# Patient Record
Sex: FEMALE | Race: WHITE | NOT HISPANIC OR LATINO | Employment: FULL TIME
[De-identification: names, ages, dates, MRNs, and addresses within clinical notes are randomized per-mention and may not be internally consistent; named-entity substitution may affect disease eponyms.]

---

## 2017-09-10 ENCOUNTER — HEALTH MAINTENANCE LETTER (OUTPATIENT)
Age: 32
End: 2017-09-10

## 2018-09-17 ENCOUNTER — HEALTH MAINTENANCE LETTER (OUTPATIENT)
Age: 33
End: 2018-09-17

## 2019-11-08 ENCOUNTER — HEALTH MAINTENANCE LETTER (OUTPATIENT)
Age: 34
End: 2019-11-08

## 2020-02-23 ENCOUNTER — HEALTH MAINTENANCE LETTER (OUTPATIENT)
Age: 35
End: 2020-02-23

## 2020-07-27 ENCOUNTER — VIRTUAL VISIT (OUTPATIENT)
Dept: FAMILY MEDICINE | Facility: OTHER | Age: 35
End: 2020-07-27
Payer: COMMERCIAL

## 2020-07-27 PROCEDURE — 99421 OL DIG E/M SVC 5-10 MIN: CPT | Performed by: PHYSICIAN ASSISTANT

## 2020-07-28 NOTE — PROGRESS NOTES
"Date: 2020 20:08:07  Clinician: Scar Szymanski  Clinician NPI: [id number]  Patient: Stephani Rider  Patient : 1985  Patient Address: [address] [place], [place] [address]  Patient Phone: [phone]  Visit Protocol: UTI  Patient Summary:  Stephani is a 34 year old ( : 1985 ) female who initiated a Visit for a presumed bladder infection. When asked the question \"Please sign me up to receive news, health information and promotions from [employer].\", Stephani responded \"No\".   Her symptoms started 1-3 days ago and consist of abdominal pain, urinary frequency, urgency, dysuria, and feeling as if the bladder is never empty.   Symptom details     Urine color: The color of her urine is yellow.     Abdominal pain: The pain is mild (1-3 on a 10 point pain scale).      Denied symptoms include flank pain, vaginal discharge, chills, urinary incontinence, vomiting, vaginal itching, foul-smelling urine, and nausea. She does not feel feverish.   Stephani has not used any over-the-counter medications or home remedies to relieve her current symptoms.  Precipitating events  Stephani denies having a sexually transmitted disease.  Pertinent medical history  Stephani has had a bladder infection before but has not had any in the past 12 months. Her current symptoms are similar to her previous bladder infection symptoms.   She is not sure what antibiotics have been effective in treating her past bladder infections.   Stephani has not been prescribed antibiotics to prevent frequent or repeated bladder infections in the past and does not get yeast infections when she takes antibiotics. She has not experienced problems or side effects with any of the common antibiotics used to treat bladder infections.   Stephani does not have a history of kidney stones. She has not used a catheter or been a patient in a hospital or nursing home in the past 2 weeks.   Stephani does not smoke or use smokeless tobacco.   She denies pregnancy and denies " breastfeeding. She has menstruated in the past month.     MEDICATIONS: citalopram oral, bupropion HCl oral, ALLERGIES: NKDA  Clinician Response:  Dear Stephani,  Based on the information you have provided, you likely have an acute urinary tract infection, also called a bladder infection. Bladder infections occur when bacteria from the outside of the body enters the urinary tract. Any part of the urinary system can be infected, but the bladder is the most common.  Medication information  I am prescribing:     Nitrofurantoin monohyd/m-cryst (Macrobid) 100 mg oral capsule. Take 1 capsule by mouth every 12 hours for 5 days. Take this medication with food. There are no refills with this prescription.   The medication I prescribed for your bladder infection is an antibiotic. Continue taking the medication until it is gone even if you feel better.   Yeast infections can be a common side effect of antibiotics. The most common symptom of a yeast infection is itchiness in and around the vagina. Other signs and symptoms include burning, redness, or a thick, white vaginal discharge that looks like cottage cheese and does not have a bad smell.  Self care  Urination helps to flush bacteria from the urinary tract. For this reason, drinking water and urinating often helps relieve some urinary symptoms and can decrease your risk of getting bladder infections in the future.  Other steps you can take to prevent future bladder infections include:     Wipe front to back after using the bathroom    Urinate after sexual intercourse    Avoid using deodorant sprays, douches, or powders in the vaginal area     When to seek care  Please make an appointment to be seen in a clinic or urgent care if any of the following occur:     You develop new symptoms or your symptoms become worse    You have medication side effects that make it difficult to take them as prescribed    Your symptoms do not improve within 1-2 days of starting treatment    You  have symptoms of a bladder infection that return shortly after completing treatment     It is possible to have an allergic reaction to an antibiotic even if you have not had one in the past. If you notice a new rash, significant swelling, or difficulty breathing, stop taking this medication immediately and go to a clinic or urgent care.   Diagnosis: Acute uncomplicated bladder infection  Diagnosis ICD: N39.0  Prescription: nitrofurantoin monohyd/m-cryst (Macrobid) 100 mg oral capsule 10 capsule, 5 days supply. Take 1 capsule by mouth every 12 hours for 5 days. Refills: 0, Refill as needed: no, Allow substitutions: yes  Pharmacy: [id number] - [phone] - [id number] [id number]

## 2020-12-06 ENCOUNTER — HEALTH MAINTENANCE LETTER (OUTPATIENT)
Age: 35
End: 2020-12-06

## 2021-04-11 ENCOUNTER — HEALTH MAINTENANCE LETTER (OUTPATIENT)
Age: 36
End: 2021-04-11

## 2021-07-16 ENCOUNTER — RECORDS - HEALTHEAST (OUTPATIENT)
Dept: ADMINISTRATIVE | Facility: CLINIC | Age: 36
End: 2021-07-16

## 2021-09-25 ENCOUNTER — HEALTH MAINTENANCE LETTER (OUTPATIENT)
Age: 36
End: 2021-09-25

## 2022-01-15 ENCOUNTER — HEALTH MAINTENANCE LETTER (OUTPATIENT)
Age: 37
End: 2022-01-15

## 2022-06-21 ENCOUNTER — OFFICE VISIT (OUTPATIENT)
Dept: FAMILY MEDICINE | Facility: CLINIC | Age: 37
End: 2022-06-21
Payer: COMMERCIAL

## 2022-06-21 VITALS
HEART RATE: 69 BPM | OXYGEN SATURATION: 100 % | HEIGHT: 65 IN | TEMPERATURE: 98.1 F | WEIGHT: 187 LBS | RESPIRATION RATE: 16 BRPM | BODY MASS INDEX: 31.16 KG/M2 | SYSTOLIC BLOOD PRESSURE: 118 MMHG | DIASTOLIC BLOOD PRESSURE: 72 MMHG

## 2022-06-21 DIAGNOSIS — Z00.00 ROUTINE GENERAL MEDICAL EXAMINATION AT A HEALTH CARE FACILITY: Primary | ICD-10-CM

## 2022-06-21 DIAGNOSIS — Z13.9 SCREENING FOR CONDITION: ICD-10-CM

## 2022-06-21 DIAGNOSIS — F41.9 ANXIETY: ICD-10-CM

## 2022-06-21 DIAGNOSIS — Z12.4 CERVICAL CANCER SCREENING: ICD-10-CM

## 2022-06-21 PROCEDURE — 87624 HPV HI-RISK TYP POOLED RSLT: CPT | Performed by: FAMILY MEDICINE

## 2022-06-21 PROCEDURE — 36415 COLL VENOUS BLD VENIPUNCTURE: CPT | Performed by: FAMILY MEDICINE

## 2022-06-21 PROCEDURE — 99385 PREV VISIT NEW AGE 18-39: CPT | Performed by: FAMILY MEDICINE

## 2022-06-21 PROCEDURE — G0145 SCR C/V CYTO,THINLAYER,RESCR: HCPCS | Performed by: FAMILY MEDICINE

## 2022-06-21 PROCEDURE — 80061 LIPID PANEL: CPT | Performed by: FAMILY MEDICINE

## 2022-06-21 RX ORDER — ESCITALOPRAM OXALATE 10 MG/1
10 TABLET ORAL DAILY
Qty: 90 TABLET | Refills: 2 | Status: SHIPPED | OUTPATIENT
Start: 2022-06-21 | End: 2023-05-03

## 2022-06-21 SDOH — ECONOMIC STABILITY: FOOD INSECURITY: WITHIN THE PAST 12 MONTHS, THE FOOD YOU BOUGHT JUST DIDN'T LAST AND YOU DIDN'T HAVE MONEY TO GET MORE.: NEVER TRUE

## 2022-06-21 SDOH — ECONOMIC STABILITY: INCOME INSECURITY: IN THE LAST 12 MONTHS, WAS THERE A TIME WHEN YOU WERE NOT ABLE TO PAY THE MORTGAGE OR RENT ON TIME?: NO

## 2022-06-21 SDOH — ECONOMIC STABILITY: FOOD INSECURITY: WITHIN THE PAST 12 MONTHS, YOU WORRIED THAT YOUR FOOD WOULD RUN OUT BEFORE YOU GOT MONEY TO BUY MORE.: NEVER TRUE

## 2022-06-21 SDOH — HEALTH STABILITY: PHYSICAL HEALTH: ON AVERAGE, HOW MANY MINUTES DO YOU ENGAGE IN EXERCISE AT THIS LEVEL?: 50 MIN

## 2022-06-21 SDOH — ECONOMIC STABILITY: INCOME INSECURITY: HOW HARD IS IT FOR YOU TO PAY FOR THE VERY BASICS LIKE FOOD, HOUSING, MEDICAL CARE, AND HEATING?: NOT HARD AT ALL

## 2022-06-21 SDOH — ECONOMIC STABILITY: TRANSPORTATION INSECURITY
IN THE PAST 12 MONTHS, HAS THE LACK OF TRANSPORTATION KEPT YOU FROM MEDICAL APPOINTMENTS OR FROM GETTING MEDICATIONS?: NO

## 2022-06-21 SDOH — ECONOMIC STABILITY: TRANSPORTATION INSECURITY
IN THE PAST 12 MONTHS, HAS LACK OF TRANSPORTATION KEPT YOU FROM MEETINGS, WORK, OR FROM GETTING THINGS NEEDED FOR DAILY LIVING?: NO

## 2022-06-21 SDOH — HEALTH STABILITY: PHYSICAL HEALTH: ON AVERAGE, HOW MANY DAYS PER WEEK DO YOU ENGAGE IN MODERATE TO STRENUOUS EXERCISE (LIKE A BRISK WALK)?: 7 DAYS

## 2022-06-21 ASSESSMENT — LIFESTYLE VARIABLES
HOW OFTEN DO YOU HAVE SIX OR MORE DRINKS ON ONE OCCASION: MONTHLY
AUDIT-C TOTAL SCORE: 7
SKIP TO QUESTIONS 9-10: 0
HOW OFTEN DO YOU HAVE A DRINK CONTAINING ALCOHOL: 4 OR MORE TIMES A WEEK
HOW MANY STANDARD DRINKS CONTAINING ALCOHOL DO YOU HAVE ON A TYPICAL DAY: 3 OR 4

## 2022-06-21 ASSESSMENT — ENCOUNTER SYMPTOMS
FREQUENCY: 0
CHILLS: 0
PALPITATIONS: 0
SHORTNESS OF BREATH: 0
HEMATOCHEZIA: 0
NAUSEA: 0
SORE THROAT: 0
DYSURIA: 0
HEARTBURN: 0
BREAST MASS: 0
NERVOUS/ANXIOUS: 1
MYALGIAS: 0
ABDOMINAL PAIN: 0
WEAKNESS: 0
FEVER: 0
HEADACHES: 0
JOINT SWELLING: 0
EYE PAIN: 0
DIZZINESS: 0
COUGH: 0
PARESTHESIAS: 0
HEMATURIA: 0
CONSTIPATION: 0
DIARRHEA: 0
ARTHRALGIAS: 0

## 2022-06-21 ASSESSMENT — SOCIAL DETERMINANTS OF HEALTH (SDOH)
HOW OFTEN DO YOU GET TOGETHER WITH FRIENDS OR RELATIVES?: THREE TIMES A WEEK
IN A TYPICAL WEEK, HOW MANY TIMES DO YOU TALK ON THE PHONE WITH FAMILY, FRIENDS, OR NEIGHBORS?: TWICE A WEEK
HOW OFTEN DO YOU ATTEND CHURCH OR RELIGIOUS SERVICES?: PATIENT DECLINED
DO YOU BELONG TO ANY CLUBS OR ORGANIZATIONS SUCH AS CHURCH GROUPS UNIONS, FRATERNAL OR ATHLETIC GROUPS, OR SCHOOL GROUPS?: NO

## 2022-06-21 NOTE — PROGRESS NOTES
SUBJECTIVE:   CC: Stephani Rider is an 36 year old woman who presents for preventive health visit.     Works as a  .   Lives with her  .  Experiencing symptoms of anxiety .  Was on Celexa wants to try escitalopram .  Felt more effective when tried her  medication .    Patient has been advised of split billing requirements and indicates understanding: Yes  Healthy Habits:     Getting at least 3 servings of Calcium per day:  Yes    Bi-annual eye exam:  NO    Dental care twice a year:  Yes    Sleep apnea or symptoms of sleep apnea:  None    Diet:  Regular (no restrictions)    Frequency of exercise:  6-7 days/week    Duration of exercise:  45-60 minutes    Taking medications regularly:  Yes    Medication side effects:  None    PHQ-2 Total Score: 2    Additional concerns today:  Yes        Today's PHQ-2 Score:   PHQ-2 ( 1999 Pfizer) 6/21/2022   Q1: Little interest or pleasure in doing things 1   Q2: Feeling down, depressed or hopeless 1   PHQ-2 Score 2   Q1: Little interest or pleasure in doing things Several days   Q2: Feeling down, depressed or hopeless Several days   PHQ-2 Score 2       Abuse: Current or Past (Physical, Sexual or Emotional) - No  Do you feel safe in your environment? Yes    Have you ever done Advance Care Planning? (For example, a Health Directive, POLST, or a discussion with a medical provider or your loved ones about your wishes): No, advance care planning information given to patient to review.  Patient plans to discuss their wishes with loved ones or provider.      Social History     Tobacco Use     Smoking status: Never Smoker     Smokeless tobacco: Never Used   Substance Use Topics     Alcohol use: No       Alcohol Use 6/21/2022   Prescreen: >3 drinks/day or >7 drinks/week? Yes   Prescreen: >3 drinks/day or >7 drinks/week? -   AUDIT SCORE  10     Reviewed orders with patient.  Reviewed health maintenance and updated orders accordingly - Yes      Breast Cancer  Screening:    Breast CA Risk Assessment (FHS-7) 6/21/2022   Do you have a family history of breast, colon, or ovarian cancer? No / Unknown       click delete button to remove this line now  Patient under 40 years of age: Routine Mammogram Screening not recommended.   Pertinent mammograms are reviewed under the imaging tab.    History of abnormal Pap smear: NO - age 30-65 PAP every 5 years with negative HPV co-testing recommended  PAP / HPV 11/11/2011   PAP (Historical) NIL     Reviewed and updated as needed this visit by clinical staff   Tobacco  Allergies  Meds  Problems  Med Hx  Surg Hx  Fam Hx  Soc   Hx          Reviewed and updated as needed this visit by Provider   Tobacco  Allergies  Meds  Problems  Med Hx  Surg Hx  Fam Hx           Past Medical History:   Diagnosis Date     Breast disorder     Ezema on left breast.     NO ACTIVE PROBLEMS       Past Surgical History:   Procedure Laterality Date     [facility] NONSPECIFIC PROCEDURE      cyst removed from neck at 6 months of age       Review of Systems   Constitutional: Negative for chills and fever.   HENT: Negative for congestion, ear pain, hearing loss and sore throat.    Eyes: Negative for pain and visual disturbance.   Respiratory: Negative for cough and shortness of breath.    Cardiovascular: Negative for chest pain, palpitations and peripheral edema.   Gastrointestinal: Negative for abdominal pain, constipation, diarrhea, heartburn, hematochezia and nausea.   Breasts:  Negative for tenderness, breast mass and discharge.   Genitourinary: Negative for dysuria, frequency, genital sores, hematuria, pelvic pain, urgency, vaginal bleeding and vaginal discharge.   Musculoskeletal: Negative for arthralgias, joint swelling and myalgias.   Skin: Negative for rash.   Neurological: Negative for dizziness, weakness, headaches and paresthesias.   Psychiatric/Behavioral: Negative for mood changes. The patient is nervous/anxious.           OBJECTIVE:   /72   " Pulse 69   Temp 98.1  F (36.7  C) (Oral)   Resp 16   Ht 1.651 m (5' 5\")   Wt 84.8 kg (187 lb)   LMP 06/12/2022 (Approximate)   SpO2 100%   BMI 31.12 kg/m    Physical Exam  Vitals and nursing note reviewed.   Constitutional:       Appearance: Normal appearance.   HENT:      Head: Normocephalic and atraumatic.      Right Ear: Tympanic membrane normal.   Cardiovascular:      Rate and Rhythm: Normal rate.      Pulses: Normal pulses.   Pulmonary:      Effort: Pulmonary effort is normal.   Abdominal:      General: Abdomen is flat.   Genitourinary:     General: Normal vulva.   Musculoskeletal:      Cervical back: Normal range of motion.   Skin:     General: Skin is warm.   Neurological:      General: No focal deficit present.      Mental Status: She is alert.   Psychiatric:         Mood and Affect: Mood normal.     Ganglion cyst .    ASSESSMENT/PLAN:   (Z00.00) Routine general medical examination at a health care facility  (primary encounter diagnosis)  Comment: Discussed healthy eating   Discussed maintaining ideal weight .    (Z12.4) Cervical cancer screening  Comment:   Plan: Pap Screen with HPV - recommended age 30 - 65         years, HPV Hold (Lab Only)            (Z13.9) Screening for condition  Comment:   Plan: Lipid panel reflex to direct LDL Fasting            (F41.9) Anxiety  Comment:   Plan: escitalopram (LEXAPRO) 10 MG tablet prescription send to pharmacy .          Discussed psychotherapy   Discussed relaxation activity     Ganglion cyst noticed on the wrist .  Explained treatment option , local compression , aspiration , cyst removal .    Patient has been advised of split billing requirements and indicates understanding: Yes    COUNSELING:  Reviewed preventive health counseling, as reflected in patient instructions       Regular exercise       Healthy diet/nutrition       Vision screening       Hearing screening    Estimated body mass index is 31.12 kg/m  as calculated from the following:    Height " "as of this encounter: 1.651 m (5' 5\").    Weight as of this encounter: 84.8 kg (187 lb).    Weight management plan: Discussed healthy diet and exercise guidelines    She reports that she has never smoked. She has never used smokeless tobacco.      Counseling Resources:  ATP IV Guidelines  Pooled Cohorts Equation Calculator  Breast Cancer Risk Calculator  BRCA-Related Cancer Risk Assessment: FHS-7 Tool  FRAX Risk Assessment  ICSI Preventive Guidelines  Dietary Guidelines for Americans, 2010  USDA's MyPlate  ASA Prophylaxis  Lung CA Screening    Tana Soto MD  [facility]  "

## 2022-06-22 LAB
CHOLEST SERPL-MCNC: 249 MG/DL
FASTING STATUS PATIENT QL REPORTED: ABNORMAL
HDLC SERPL-MCNC: 97 MG/DL
LDLC SERPL CALC-MCNC: 128 MG/DL
NONHDLC SERPL-MCNC: 152 MG/DL
TRIGL SERPL-MCNC: 120 MG/DL

## 2022-06-24 LAB
BKR LAB AP GYN ADEQUACY: NORMAL
BKR LAB AP GYN INTERPRETATION: NORMAL
BKR LAB AP HPV REFLEX: NORMAL
BKR LAB AP PREVIOUS ABNORMAL: NORMAL
PATH REPORT.COMMENTS IMP SPEC: NORMAL
PATH REPORT.COMMENTS IMP SPEC: NORMAL
PATH REPORT.RELEVANT HX SPEC: NORMAL

## 2022-06-28 LAB
HUMAN PAPILLOMA VIRUS 16 DNA: NEGATIVE
HUMAN PAPILLOMA VIRUS 18 DNA: NEGATIVE
HUMAN PAPILLOMA VIRUS FINAL DIAGNOSIS: NORMAL
HUMAN PAPILLOMA VIRUS OTHER HR: NEGATIVE

## 2022-09-16 ENCOUNTER — NURSE TRIAGE (OUTPATIENT)
Dept: FAMILY MEDICINE | Facility: CLINIC | Age: 37
End: 2022-09-16

## 2022-09-16 NOTE — TELEPHONE ENCOUNTER
Nurse Triage SBAR    Is this a 2nd Level Triage? YES, LICENSED PRACTITIONER REVIEW IS REQUIRED    Situation: Patient calls with persistent cough following Covid    Background: Patient was Covid positive 3 weeks ago. Uncomplicated medical history.    Assessment: Patient called central scheduling to schedule a same day appointment in [place]. Transferred to Triage.     Patient had Covid at least 3 weeks ago. Patient has experienced a persistent cough. Cough is occasionally productive with yellowish phlegm. Patient's throat is irritated from frequent coughing. Patient's chest is sore when coughing but not painful at rest. Patient was unable to sleep last night due to frequent coughing.    Patient denies fever, nasal congestion, difficulty breathing. Patient took a daytime cough suppressant medication today but has otherwise not treated cough.    Patient requesting same day appointment. Patient requesting medication to help stop cough.    Ok to leave a detailed voicemail. Pharmacy pended. Patient advised of E-Visit but would like to be seen in office.    Protocol Recommended Disposition:   See in Office Today or Tomorrow    Recommendation: Please advise on visit type or if patient can be worked in.    Routed to provider    Does the patient meet one of the following criteria for ADS visit consideration? 16+ years old, with an [facility] PCP     TIP  Providers, please consider if this condition is appropriate for management at one of our Acute and Diagnostic Services sites.     If patient is a good candidate, please use dotphrase <dot>triageresponse and select Refer to ADS to document.  Reason for Disposition    Continuous (nonstop) coughing interferes with work or school and no improvement using cough treatment per Care Advice    Additional Information    Negative: Bluish (or gray) lips or face    Negative: SEVERE difficulty breathing (e.g., struggling for each breath, speaks in single words)    Negative: Rapid onset of  cough and has hives    Negative: Coughing started suddenly after medicine, an allergic food or bee sting    Negative: Difficulty breathing after exposure to flames, smoke, or fumes    Negative: Sounds like a life-threatening emergency to the triager    Negative: Previous asthma attacks and this feels like asthma attack    Negative: Dry cough (non-productive; no sputum or minimal clear sputum) and within 14 days of COVID-19 Exposure    Negative: MODERATE difficulty breathing (e.g., speaks in phrases, SOB even at rest, pulse 100-120) and still present when not coughing    Negative: Chest pain present when not coughing    Negative: Passed out (i.e., fainted, collapsed and was not responding)    Negative: Patient sounds very sick or weak to the triager    Negative: MILD difficulty breathing (e.g., minimal/no SOB at rest, SOB with walking, pulse <100) and still present when not coughing    Negative: Coughed up > 1 tablespoon (15 ml) blood (Exception: Blood-tinged sputum.)    Negative: Fever > 103 F (39.4 C)    Negative: Fever > 101 F (38.3 C) and over 60 years of age    Negative: Fever > 100.0 F (37.8 C) and has diabetes mellitus or a weak immune system (e.g., HIV positive, cancer chemotherapy, organ transplant, splenectomy, chronic steroids)    Negative: Fever > 100.0 F (37.8 C) and bedridden (e.g., nursing home patient, stroke, chronic illness, recovering from surgery)    Negative: Increasing ankle swelling    Negative: Wheezing is present    Negative: SEVERE coughing spells (e.g., whooping sound after coughing, vomiting after coughing)    Negative: Coughing up brayan-colored (reddish-brown) or blood-tinged sputum    Negative: Fever present > 3 days (72 hours)    Negative: Fever returns after gone for over 24 hours and symptoms worse or not improved    Negative: Using nasal washes and pain medicine > 24 hours and sinus pain persists    Negative: Known COPD or other severe lung disease (i.e., bronchiectasis, cystic  "fibrosis, lung surgery) and worsening symptoms (i.e., increased sputum purulence or amount, increased breathing difficulty)    Answer Assessment - Initial Assessment Questions  1. ONSET: \"When did the cough begin?\"       Has been consistent over past month with worsening cough last night  2. SEVERITY: \"How bad is the cough today?\"       Persistent, disrupts ADLs  3. SPUTUM: \"Describe the color of your sputum\" (none, dry cough; clear, white, yellow, green)      Intermittent productive. Can be clear, can be thicker  4. HEMOPTYSIS: \"Are you coughing up any blood?\" If so ask: \"How much?\" (flecks, streaks, tablespoons, etc.)      No  5. DIFFICULTY BREATHING: \"Are you having difficulty breathing?\" If Yes, ask: \"How bad is it?\" (e.g., mild, moderate, severe)     - MILD: No SOB at rest, mild SOB with walking, speaks normally in sentences, can lie down, no retractions, pulse < 100.     - MODERATE: SOB at rest, SOB with minimal exertion and prefers to sit, cannot lie down flat, speaks in phrases, mild retractions, audible wheezing, pulse 100-120.     - SEVERE: Very SOB at rest, speaks in single words, struggling to breathe, sitting hunched forward, retractions, pulse > 120       No  6. FEVER: \"Do you have a fever?\" If Yes, ask: \"What is your temperature, how was it measured, and when did it start?\"      No  7. CARDIAC HISTORY: \"Do you have any history of heart disease?\" (e.g., heart attack, congestive heart failure)       No  8. LUNG HISTORY: \"Do you have any history of lung disease?\"  (e.g., pulmonary embolus, asthma, emphysema)      No  9. PE RISK FACTORS: \"Do you have a history of blood clots?\" (or: recent major surgery, recent prolonged travel, bedridden)      No  10. OTHER SYMPTOMS: \"Do you have any other symptoms?\" (e.g., runny nose, wheezing, chest pain)        Chest discomfort when coughing  11. PREGNANCY: \"Is there any chance you are pregnant?\" \"When was your last menstrual period?\"        No  12. TRAVEL: \"Have you " "traveled out of the country in the last month?\" (e.g., travel history, exposures)        No    Protocols used: COUGH-A-OH      " shira

## 2022-09-16 NOTE — TELEPHONE ENCOUNTER
Called and spoke with pt, per protocol should be seen in in 1-2 days. No available appt, pt is requesting Prednisone to RN, advised pt to be seen in UC. Pt declines, would like to schedule appt, scheduled appt for 9/21/22, advised pt to be seen in UC prior to appt if sx worsened, verbalized understanding.     Tonja YANG RN

## 2022-09-21 ENCOUNTER — OFFICE VISIT (OUTPATIENT)
Dept: FAMILY MEDICINE | Facility: CLINIC | Age: 37
End: 2022-09-21
Payer: COMMERCIAL

## 2022-09-21 VITALS
HEIGHT: 65 IN | SYSTOLIC BLOOD PRESSURE: 114 MMHG | WEIGHT: 181.3 LBS | RESPIRATION RATE: 16 BRPM | OXYGEN SATURATION: 100 % | DIASTOLIC BLOOD PRESSURE: 76 MMHG | TEMPERATURE: 98.4 F | HEART RATE: 72 BPM | BODY MASS INDEX: 30.21 KG/M2

## 2022-09-21 DIAGNOSIS — J20.9 ACUTE BRONCHITIS WITH SYMPTOMS > 10 DAYS: Primary | ICD-10-CM

## 2022-09-21 PROCEDURE — 99213 OFFICE O/P EST LOW 20 MIN: CPT | Performed by: FAMILY MEDICINE

## 2022-09-21 RX ORDER — AZITHROMYCIN 250 MG/1
TABLET, FILM COATED ORAL
Qty: 6 TABLET | Refills: 0 | Status: SHIPPED | OUTPATIENT
Start: 2022-09-21 | End: 2022-09-26

## 2022-09-21 RX ORDER — BENZONATATE 200 MG/1
200 CAPSULE ORAL 3 TIMES DAILY PRN
Qty: 20 CAPSULE | Refills: 0 | Status: SHIPPED | OUTPATIENT
Start: 2022-09-21 | End: 2023-09-21

## 2022-09-21 NOTE — PROGRESS NOTES
"  Assessment & Plan   See after visit summary for helpful information and advice given to patient.    Acute bronchitis with symptoms > 10 days    - azithromycin (ZITHROMAX) 250 MG tablet  Dispense: 6 tablet; Refill: 0  - benzonatate (TESSALON) 200 MG capsule  Dispense: 20 capsule; Refill: 0                   Return in about 5 days (around 9/26/2022), or if symptoms worsen or fail to improve.    Rylan Wheeler DO  [facility] ADELINE Styles is a 36 year old, presenting for the following health issues:  Covid Concern (Lingering symptoms)      History of Present Illness       Reason for visit:  Lingering symptoms from Covid  Symptom onset:  More than a month  Symptoms include:  Hacking cough.  Sometimes dry.  Stuffy nose.  Symptom intensity:  Severe  Symptom progression:  Staying the same  Had these symptoms before:  No  What makes it worse:  No  What makes it better:  NyQuil    She eats 2-3 servings of fruits and vegetables daily.She consumes 0 sweetened beverage(s) daily.She exercises with enough effort to increase her heart rate 30 to 60 minutes per day.  She exercises with enough effort to increase her heart rate 4 days per week.   She is taking medications regularly.             Review of Systems   Patient is seen for chief concern of persistent cough.    Patient has persistent dry cough for the past 2 months, after having diagnosis of COVID-19 about 2 months ago.     No recent fever or chills.     She feels some phlegm in throat at night.     She has chronic nasal allergies.  She does not take any prescribed medications for this.        Objective    /76   Pulse 72   Temp 98.4  F (36.9  C) (Oral)   Resp 16   Ht 1.651 m (5' 5\")   Wt 82.2 kg (181 lb 4.8 oz)   LMP 08/30/2022 (Approximate)   SpO2 100%   BMI 30.17 kg/m    Body mass index is 30.17 kg/m .  Physical Exam   Vital signs reviewed.  Patient is in no acute appearing distress.  Breathing appears nonlabored.  Patient " is alert and oriented ×3.  Patient is very pleasant, making good eye contact and responding with clear fluent speech.    ENT: Ear exam shows bilateral tympanic membranes to be clear without injection, nasal turbinates show no injection or edema, no pharyngeal injection or exudate.    Neck: supple with no adenoapthy, palpable abnormal masses, or thyroid abnormality.    Heart: Heart rate is regular without murmur.    Lungs: Lungs are clear to auscultation with good airflow bilaterally.  She has a rare cough during exam.    Skin: Skin is warm and dry without any rash noted.

## 2023-01-07 ENCOUNTER — HEALTH MAINTENANCE LETTER (OUTPATIENT)
Age: 38
End: 2023-01-07

## 2023-05-03 DIAGNOSIS — F41.9 ANXIETY: ICD-10-CM

## 2023-05-03 RX ORDER — ESCITALOPRAM OXALATE 10 MG/1
10 TABLET ORAL DAILY
Qty: 90 TABLET | Refills: 0 | Status: SHIPPED | OUTPATIENT
Start: 2023-05-03 | End: 2023-08-11

## 2023-05-03 NOTE — TELEPHONE ENCOUNTER
Routing refill request to provider for review/approval because:  Drug interaction warning  Kishan QUIROZ RN, BSN

## 2023-08-11 DIAGNOSIS — F41.9 ANXIETY: ICD-10-CM

## 2023-08-11 RX ORDER — ESCITALOPRAM OXALATE 10 MG/1
10 TABLET ORAL DAILY
Qty: 30 TABLET | Refills: 0 | Status: SHIPPED | OUTPATIENT
Start: 2023-08-11 | End: 2023-09-08

## 2023-08-11 NOTE — TELEPHONE ENCOUNTER
Routing refill request to provider for review/approval because:  Caroline given x1 and patient did not follow up, please advise  Patient needs to be seen because it has been more than 1 year since last office visit.    Team please call to schedule OV with provider.    Nohemi LIRIANO RN

## 2023-08-17 NOTE — TELEPHONE ENCOUNTER
Pt calling to request refill. Advised of need to make an appointment and that a refill was sent 8/11/23. Appointment scheduled for 9/21/23.    Jose Mai RN [facility]

## 2023-09-08 ENCOUNTER — TELEPHONE (OUTPATIENT)
Dept: FAMILY MEDICINE | Facility: CLINIC | Age: 38
End: 2023-09-08
Payer: COMMERCIAL

## 2023-09-08 DIAGNOSIS — F41.9 ANXIETY: ICD-10-CM

## 2023-09-08 NOTE — TELEPHONE ENCOUNTER
Medication Question or Refill    Contacts         Type Contact Phone/Fax    09/08/2023 05:09 PM CDT Phone (Incoming) MeirtierneyStephani (Self) [phone] (W)            What medication are you calling about (include dose and sig)?: Asticalipram?  10mg    Preferred Pharmacy:   [facility] #[id number] - [place], [place] - [address] [address]  [address] [facility] [address]  Phone: [phone] Fax: [phone]      Controlled Substance Agreement on file:   CSA -- Patient Level:    CSA: None found at the patient level.       Who prescribed the medication?: Vi    Do you need a refill? Yes    When did you use the medication last? A couple days ago.      Patient offered an appointment? No    Do you have any questions or concerns?  No only that she is out, and needs more before the next appointment.        Could we send this information to you in [facility] or would you prefer to receive a phone call?:   Patient would prefer a phone call   Okay to leave a detailed message?: No at Other phone number:   [phone]

## 2023-09-11 RX ORDER — ESCITALOPRAM OXALATE 10 MG/1
10 TABLET ORAL DAILY
Qty: 30 TABLET | Refills: 0 | Status: SHIPPED | OUTPATIENT
Start: 2023-09-11 | End: 2023-09-21

## 2023-09-11 NOTE — TELEPHONE ENCOUNTER
Medication was ordered today by Dr. Soto, will be enough until next appointment. No further action needed at this time. Pharamacy should alert patient when fill is ready for . Catrachita Bess R.N.

## 2023-09-21 ENCOUNTER — OFFICE VISIT (OUTPATIENT)
Dept: FAMILY MEDICINE | Facility: CLINIC | Age: 38
End: 2023-09-21
Payer: COMMERCIAL

## 2023-09-21 VITALS
BODY MASS INDEX: 30.66 KG/M2 | HEIGHT: 65 IN | SYSTOLIC BLOOD PRESSURE: 110 MMHG | TEMPERATURE: 98.1 F | HEART RATE: 64 BPM | OXYGEN SATURATION: 98 % | WEIGHT: 184 LBS | RESPIRATION RATE: 14 BRPM | DIASTOLIC BLOOD PRESSURE: 74 MMHG

## 2023-09-21 DIAGNOSIS — Z00.00 ROUTINE GENERAL MEDICAL EXAMINATION AT A HEALTH CARE FACILITY: Primary | ICD-10-CM

## 2023-09-21 DIAGNOSIS — F41.9 ANXIETY: ICD-10-CM

## 2023-09-21 LAB
ERYTHROCYTE [DISTWIDTH] IN BLOOD BY AUTOMATED COUNT: 13.2 % (ref 10–15)
HCT VFR BLD AUTO: 37.3 % (ref 35–47)
HGB BLD-MCNC: 12.4 G/DL (ref 11.7–15.7)
MCH RBC QN AUTO: 30.5 PG (ref 26.5–33)
MCHC RBC AUTO-ENTMCNC: 33.2 G/DL (ref 31.5–36.5)
MCV RBC AUTO: 92 FL (ref 78–100)
PLATELET # BLD AUTO: 232 10E3/UL (ref 150–450)
RBC # BLD AUTO: 4.06 10E6/UL (ref 3.8–5.2)
WBC # BLD AUTO: 7.5 10E3/UL (ref 4–11)

## 2023-09-21 PROCEDURE — 84443 ASSAY THYROID STIM HORMONE: CPT | Performed by: FAMILY MEDICINE

## 2023-09-21 PROCEDURE — 36415 COLL VENOUS BLD VENIPUNCTURE: CPT | Performed by: FAMILY MEDICINE

## 2023-09-21 PROCEDURE — 80061 LIPID PANEL: CPT | Performed by: FAMILY MEDICINE

## 2023-09-21 PROCEDURE — 85027 COMPLETE CBC AUTOMATED: CPT | Performed by: FAMILY MEDICINE

## 2023-09-21 PROCEDURE — 99395 PREV VISIT EST AGE 18-39: CPT | Performed by: FAMILY MEDICINE

## 2023-09-21 RX ORDER — ESCITALOPRAM OXALATE 10 MG/1
10 TABLET ORAL DAILY
Qty: 90 TABLET | Refills: 4 | Status: SHIPPED | OUTPATIENT
Start: 2023-09-21

## 2023-09-21 ASSESSMENT — ENCOUNTER SYMPTOMS
MYALGIAS: 0
ARTHRALGIAS: 0
FEVER: 0
PALPITATIONS: 1
ABDOMINAL PAIN: 0
FREQUENCY: 0
CHILLS: 0
DIARRHEA: 0
HEADACHES: 0
EYE PAIN: 0
SHORTNESS OF BREATH: 0
NAUSEA: 0
CONSTIPATION: 0
HEMATURIA: 0
JOINT SWELLING: 0
COUGH: 0
NERVOUS/ANXIOUS: 1
SORE THROAT: 0
DIZZINESS: 0
DYSURIA: 0
HEARTBURN: 0
WEAKNESS: 0
PARESTHESIAS: 0
HEMATOCHEZIA: 0

## 2023-09-21 ASSESSMENT — ANXIETY QUESTIONNAIRES
1. FEELING NERVOUS, ANXIOUS, OR ON EDGE: NEARLY EVERY DAY
5. BEING SO RESTLESS THAT IT IS HARD TO SIT STILL: NOT AT ALL
GAD7 TOTAL SCORE: 7
GAD7 TOTAL SCORE: 7
6. BECOMING EASILY ANNOYED OR IRRITABLE: NOT AT ALL
2. NOT BEING ABLE TO STOP OR CONTROL WORRYING: SEVERAL DAYS
3. WORRYING TOO MUCH ABOUT DIFFERENT THINGS: SEVERAL DAYS
7. FEELING AFRAID AS IF SOMETHING AWFUL MIGHT HAPPEN: NOT AT ALL

## 2023-09-21 ASSESSMENT — PATIENT HEALTH QUESTIONNAIRE - PHQ9
SUM OF ALL RESPONSES TO PHQ QUESTIONS 1-9: 9
5. POOR APPETITE OR OVEREATING: MORE THAN HALF THE DAYS
SUM OF ALL RESPONSES TO PHQ QUESTIONS 1-9: 9

## 2023-09-21 NOTE — PROGRESS NOTES
SUBJECTIVE:   CC: Stephani is an 37 year old who presents for preventive health visit.       2023     3:17 PM   Additional Questions   Roomed by Claudette HEAYL CMA       Healthy Habits:     Getting at least 3 servings of Calcium per day:  Yes    Bi-annual eye exam:  Yes    Dental care twice a year:  Yes    Sleep apnea or symptoms of sleep apnea:  None    Diet:  Regular (no restrictions)    Frequency of exercise:  1 day/week    Duration of exercise:  15-30 minutes    Taking medications regularly:  Yes    Medication side effects:  None    Additional concerns today:  Yes      Today's PHQ-9 Score:       2023     3:15 PM   PHQ-9 SCORE   PHQ-9 Total Score MyChart 9 (Mild depression)   PHQ-9 Total Score 9         Social History     Tobacco Use    Smoking status: Never    Smokeless tobacco: Never   Substance Use Topics    Alcohol use: Yes     Comment: 9 drinks a week             2023     3:14 PM   Alcohol Use   Prescreen: >3 drinks/day or >7 drinks/week? Yes   AUDIT SCORE  5     Reviewed orders with patient.  Reviewed health maintenance and updated orders accordingly - Yes    Breast Cancer Screenin/21/2022     9:54 AM   Breast CA Risk Assessment (FHS-7)   Do you have a family history of breast, colon, or ovarian cancer? No / Unknown         Patient under 40 years of age: Routine Mammogram Screening not recommended.   Pertinent mammograms are reviewed under the imaging tab.    History of abnormal Pap smear: NO - age 30-65 PAP every 5 years with negative HPV co-testing recommended      Latest Ref Rng & Units 2022    10:21 AM 2011     9:20 AM   PAP / HPV   PAP  Negative for Intraepithelial Lesion or Malignancy (NILM)     PAP (Historical)   NIL    HPV 16 DNA Negative Negative     HPV 18 DNA Negative Negative     Other HR HPV Negative Negative       Reviewed and updated as needed this visit by clinical staff   Tobacco  Allergies  Meds              Reviewed and updated as needed this visit by  "Provider                 Past Medical History:   Diagnosis Date    Breast disorder     Ezema on left breast.    NO ACTIVE PROBLEMS       Past Surgical History:   Procedure Laterality Date    [facility] NONSPECIFIC PROCEDURE      cyst removed from neck at 6 months of age       Review of Systems   Constitutional:  Negative for chills and fever.   HENT:  Negative for congestion, ear pain, hearing loss and sore throat.    Eyes:  Negative for pain and visual disturbance.   Respiratory:  Negative for cough and shortness of breath.    Cardiovascular:  Positive for palpitations. Negative for chest pain and peripheral edema.   Gastrointestinal:  Negative for abdominal pain, constipation, diarrhea, heartburn, hematochezia and nausea.   Genitourinary:  Negative for dysuria, frequency, genital sores, hematuria and urgency.   Musculoskeletal:  Negative for arthralgias, joint swelling and myalgias.   Skin:  Negative for rash.   Neurological:  Negative for dizziness, weakness, headaches and paresthesias.   Psychiatric/Behavioral:  Negative for mood changes. The patient is nervous/anxious.         OBJECTIVE:   /74   Pulse 64   Temp 98.1  F (36.7  C) (Oral)   Resp 14   Ht 1.638 m (5' 4.5\")   Wt 83.5 kg (184 lb)   LMP 09/07/2023 (Approximate)   SpO2 98%   Breastfeeding No   BMI 31.10 kg/m    Physical Exam  GENERAL: healthy, alert and no distress  EYES: Eyes grossly normal to inspection, PERRL and conjunctivae and sclerae normal  HENT: ear canals and TM's normal, nose and mouth without ulcers or lesions  NECK: no adenopathy, no asymmetry, masses, or scars and thyroid normal to palpation  RESP: lungs clear to auscultation - no rales, rhonchi or wheezes  BREAST: normal without masses, tenderness or nipple discharge and no palpable axillary masses or adenopathy  CV: regular rate and rhythm, normal S1 S2, no S3 or S4, no murmur, click or rub, no peripheral edema and peripheral pulses strong  ABDOMEN: soft, nontender, no " "hepatosplenomegaly, no masses and bowel sounds normal  MS: no gross musculoskeletal defects noted, no edema  SKIN: no suspicious lesions or rashes  NEURO: Normal strength and tone, mentation intact and speech normal  PSYCH: mentation appears normal, affect normal/bright    Diagnostic Test Results:  Labs reviewed in Epic    ASSESSMENT/PLAN:   (Z00.00) Routine general medical examination at a health care facility  (primary encounter diagnosis)  Comment: Discussed healthy eating   Discussed maintaining ideal  weight   Plan: TSH with free T4 reflex, CBC with platelets,         Lipid panel reflex to direct LDL Fasting            (F41.9) Anxiety  Comment: Discussed option to increase the dose if symptoms uncontrolled .  Plan: escitalopram (LEXAPRO) 10 MG tablet              COUNSELING:  Reviewed preventive health counseling, as reflected in patient instructions       Regular exercise       Healthy diet/nutrition       Vision screening       Hearing screening      BMI:   Estimated body mass index is 31.1 kg/m  as calculated from the following:    Height as of this encounter: 1.638 m (5' 4.5\").    Weight as of this encounter: 83.5 kg (184 lb).   Weight management plan: Discussed healthy diet and exercise guidelines      She reports that she has never smoked. She has never used smokeless tobacco.          Tana Soto MD  [facility] submitted by the patient for this visit:  Patient Health Questionnaire (Submitted on 9/21/2023)  PHQ9 TOTAL SCORE: 9  "

## 2023-09-22 LAB
CHOLEST SERPL-MCNC: 266 MG/DL
HDLC SERPL-MCNC: 115 MG/DL
LDLC SERPL CALC-MCNC: 120 MG/DL
NONHDLC SERPL-MCNC: 151 MG/DL
TRIGL SERPL-MCNC: 156 MG/DL
TSH SERPL DL<=0.005 MIU/L-ACNC: 1.85 UIU/ML (ref 0.3–4.2)

## 2023-11-15 ENCOUNTER — MYC MEDICAL ADVICE (OUTPATIENT)
Dept: FAMILY MEDICINE | Facility: CLINIC | Age: 38
End: 2023-11-15

## 2023-11-15 ENCOUNTER — OFFICE VISIT (OUTPATIENT)
Dept: URGENT CARE | Facility: URGENT CARE | Age: 38
End: 2023-11-15
Payer: COMMERCIAL

## 2023-11-15 ENCOUNTER — ANCILLARY PROCEDURE (OUTPATIENT)
Dept: GENERAL RADIOLOGY | Facility: CLINIC | Age: 38
End: 2023-11-15
Attending: FAMILY MEDICINE
Payer: COMMERCIAL

## 2023-11-15 VITALS
RESPIRATION RATE: 20 BRPM | BODY MASS INDEX: 29.99 KG/M2 | SYSTOLIC BLOOD PRESSURE: 127 MMHG | HEIGHT: 65 IN | HEART RATE: 88 BPM | TEMPERATURE: 99.3 F | DIASTOLIC BLOOD PRESSURE: 86 MMHG | WEIGHT: 180 LBS | OXYGEN SATURATION: 96 %

## 2023-11-15 DIAGNOSIS — R06.2 WHEEZING: ICD-10-CM

## 2023-11-15 DIAGNOSIS — R05.1 ACUTE COUGH: ICD-10-CM

## 2023-11-15 DIAGNOSIS — J18.9 PNEUMONIA OF LEFT LOWER LOBE DUE TO INFECTIOUS ORGANISM: Primary | ICD-10-CM

## 2023-11-15 DIAGNOSIS — J40 BRONCHITIS: Primary | ICD-10-CM

## 2023-11-15 LAB
BASOPHILS # BLD AUTO: 0 10E3/UL (ref 0–0.2)
BASOPHILS NFR BLD AUTO: 0 %
EOSINOPHIL # BLD AUTO: 0.1 10E3/UL (ref 0–0.7)
EOSINOPHIL NFR BLD AUTO: 2 %
ERYTHROCYTE [DISTWIDTH] IN BLOOD BY AUTOMATED COUNT: 12.1 % (ref 10–15)
FLUAV AG SPEC QL IA: NEGATIVE
FLUBV AG SPEC QL IA: NEGATIVE
HCT VFR BLD AUTO: 37.3 % (ref 35–47)
HGB BLD-MCNC: 12.3 G/DL (ref 11.7–15.7)
IMM GRANULOCYTES # BLD: 0 10E3/UL
IMM GRANULOCYTES NFR BLD: 1 %
LYMPHOCYTES # BLD AUTO: 0.8 10E3/UL (ref 0.8–5.3)
LYMPHOCYTES NFR BLD AUTO: 12 %
MCH RBC QN AUTO: 30.1 PG (ref 26.5–33)
MCHC RBC AUTO-ENTMCNC: 33 G/DL (ref 31.5–36.5)
MCV RBC AUTO: 91 FL (ref 78–100)
MONOCYTES # BLD AUTO: 0.6 10E3/UL (ref 0–1.3)
MONOCYTES NFR BLD AUTO: 9 %
NEUTROPHILS # BLD AUTO: 4.8 10E3/UL (ref 1.6–8.3)
NEUTROPHILS NFR BLD AUTO: 76 %
PLATELET # BLD AUTO: 251 10E3/UL (ref 150–450)
RBC # BLD AUTO: 4.09 10E6/UL (ref 3.8–5.2)
WBC # BLD AUTO: 6.3 10E3/UL (ref 4–11)

## 2023-11-15 PROCEDURE — 85025 COMPLETE CBC W/AUTO DIFF WBC: CPT | Performed by: FAMILY MEDICINE

## 2023-11-15 PROCEDURE — 36415 COLL VENOUS BLD VENIPUNCTURE: CPT | Performed by: FAMILY MEDICINE

## 2023-11-15 PROCEDURE — 71046 X-RAY EXAM CHEST 2 VIEWS: CPT | Mod: TC | Performed by: RADIOLOGY

## 2023-11-15 PROCEDURE — 87804 INFLUENZA ASSAY W/OPTIC: CPT

## 2023-11-15 PROCEDURE — 99214 OFFICE O/P EST MOD 30 MIN: CPT | Performed by: FAMILY MEDICINE

## 2023-11-15 RX ORDER — ALBUTEROL SULFATE 90 UG/1
2 AEROSOL, METERED RESPIRATORY (INHALATION) EVERY 6 HOURS PRN
Qty: 18 G | Refills: 0 | Status: SHIPPED | OUTPATIENT
Start: 2023-11-15

## 2023-11-15 RX ORDER — BENZONATATE 200 MG/1
200 CAPSULE ORAL 3 TIMES DAILY PRN
Qty: 21 CAPSULE | Refills: 0 | Status: SHIPPED | OUTPATIENT
Start: 2023-11-15

## 2023-11-15 NOTE — PATIENT INSTRUCTIONS
Soothe a sore throat and cough  Gargle every 2 hours with 1/4 teaspoon of salt dissolved in 1/2 cup of warm water. Suck on throat lozenges and cough drops to moisten your throat.  Gargling with Chloraseptic spray   Cough medicines are available but it is unclear how effective they actually are.  Manuka Honey tbs for cough suppressant   Take acetaminophen or an NSAID, such as ibuprofen to ease throat pain  Humidifier in room where you are sleeping.     When to seek medical care  When you first notice symptoms, ask your health care provider if antiviral medications are appropriate. Antibiotics should not be taken for colds or flu. Also, call your doctor if you have any of the following symptoms or if you aren t feeling better after 7 days:  Shortness of breath  Pain or pressure in the chest or abdomen  Worsening symptoms, especially after a period of improvement  Fever that doesn t go down with medication  Sudden dizziness or confusion  Severe or continued vomiting  Signs of dehydration, including extreme thirst, dark urine, infrequent urination, dry mouth  Spotted, red, or very sore throat

## 2023-11-15 NOTE — PROGRESS NOTES
URGENT CARE VISIT:    ASSESSMENT AND PLAN:      ICD-10-CM    1. Acute cough  R05.1 Influenza A/B antigen     CBC with Platelets & Differential     XR Chest 2 Views      2. Wheezing  R06.2 Influenza A/B antigen     CBC with Platelets & Differential     XR Chest 2 Views          Asthma, Bronchitis-viral, Influenza, Viral upper respiratory illness, postviral cough, pneumonia, etc.  CBC and flu are reassuring today.  Chest x-ray read by  provider with no acute concerns or consolidation noted.  Prescription for albuterol inhaler and Tessalon Perles to aid with cough; side effects of medication, finishing full course, and use of probiotics was discussed.  Supportive and OTC measures outlined in AVS and discussed.      Red flag symptoms for urgent evaluation via ED shared.      Follow up with primary care provider with any problems, questions or concerns or if symptoms worsen or fail to improve. Patient verbalized understanding and is agreeable to plan. The patient was discharged ambulatory and in stable condition.    SUBJECTIVE:   Stephani Rider is a 37 year old female presenting for chief complaint of cough - non-productive and wheezing.  Patient notes at the beginning of illness she did have fever but no longer.  Onset was 2 week(s) ago.   She denies the following symptoms: fever, chills, runny nose, stuffy nose, shortness of breath, sore throat, vomiting, and diarrhea  Course of illness is same.    Treatment measures tried include Tylenol/Ibuprofen and OTC Cough med with some relief of symptoms.        COVID Home testing:  negative    PMH:   Past Medical History:   Diagnosis Date    Breast disorder     Ezema on left breast.    NO ACTIVE PROBLEMS      Allergies: Dust mites   Medications:   Current Outpatient Medications   Medication Sig Dispense Refill    escitalopram (LEXAPRO) 10 MG tablet Take 1 tablet (10 mg) by mouth daily 90 tablet 4     Social History:   Social History     Tobacco Use    Smoking status: Never  "   Smokeless tobacco: Never   Substance Use Topics    Alcohol use: Yes     Comment: 9 drinks a week       ROS:  Review of systems negative except as stated above.    OBJECTIVE:  /86   Pulse 88   Temp 99.3  F (37.4  C) (Oral)   Resp 20   Ht 1.651 m (5' 5\")   Wt 81.6 kg (180 lb)   LMP 09/07/2023 (Approximate)   SpO2 96%   BMI 29.95 kg/m      GENERAL APPEARANCE: healthy, alert and no distress  EYES: EOMI,  PERRL, conjunctiva clear  HENT: ear canals and TM's normal.  Nose and mouth without ulcers, erythema or lesions  NECK: supple, nontender, no lymphadenopathy  RESP: lungs clear to auscultation - no rales, rhonchi or wheezes  CV: regular rates and rhythm, normal S1 S2, no murmur noted  SKIN: no suspicious lesions or rashes    Labs:      Results for orders placed or performed in visit on 11/15/23 (from the past 24 hour(s))   Influenza A/B antigen    Specimen: Nose; Swab   Result Value Ref Range    Influenza A antigen Negative Negative    Influenza B antigen Negative Negative    Narrative    Test results must be correlated with clinical data. If necessary, results should be confirmed by a molecular assay or viral culture.   CBC with Platelets & Differential    Narrative    The following orders were created for panel order CBC with Platelets & Differential.  Procedure                               Abnormality         Status                     ---------                               -----------         ------                     CBC with platelets and d...[[id number]]                      Final result                 Please view results for these tests on the individual orders.   CBC with platelets and differential   Result Value Ref Range    WBC Count 6.3 4.0 - 11.0 10e3/uL    RBC Count 4.09 3.80 - 5.20 10e6/uL    Hemoglobin 12.3 11.7 - 15.7 g/dL    Hematocrit 37.3 35.0 - 47.0 %    MCV 91 78 - 100 fL    MCH 30.1 26.5 - 33.0 pg    MCHC 33.0 31.5 - 36.5 g/dL    RDW 12.1 10.0 - 15.0 %    Platelet Count 251 150 " - 450 10e3/uL    % Neutrophils 76 %    % Lymphocytes 12 %    % Monocytes 9 %    % Eosinophils 2 %    % Basophils 0 %    % Immature Granulocytes 1 %    Absolute Neutrophils 4.8 1.6 - 8.3 10e3/uL    Absolute Lymphocytes 0.8 0.8 - 5.3 10e3/uL    Absolute Monocytes 0.6 0.0 - 1.3 10e3/uL    Absolute Eosinophils 0.1 0.0 - 0.7 10e3/uL    Absolute Basophils 0.0 0.0 - 0.2 10e3/uL    Absolute Immature Granulocytes 0.0 <=0.4 10e3/uL

## 2023-11-16 RX ORDER — AZITHROMYCIN 250 MG/1
TABLET, FILM COATED ORAL
Qty: 6 TABLET | Refills: 0 | Status: SHIPPED | OUTPATIENT
Start: 2023-11-16

## 2023-11-20 RX ORDER — PREDNISONE 20 MG/1
20 TABLET ORAL DAILY
Qty: 5 TABLET | Refills: 0 | Status: SHIPPED | OUTPATIENT
Start: 2023-11-20

## 2024-11-10 ENCOUNTER — HEALTH MAINTENANCE LETTER (OUTPATIENT)
Age: 39
End: 2024-11-10

## 2024-11-13 DIAGNOSIS — F41.9 ANXIETY: ICD-10-CM

## 2024-11-13 RX ORDER — ESCITALOPRAM OXALATE 10 MG/1
10 TABLET ORAL DAILY
Qty: 90 TABLET | Refills: 4 | OUTPATIENT
Start: 2024-11-13

## 2024-11-13 NOTE — LETTER
November 15, 2024      Stephani KIKE Rider  [id number] [facility] [address]        Stephani M Adry      We recently received a refill request for your ESCITALOPRAM 10 MG TABLET .     Our records show you are due for a follow up visit with your provider.     Please call the clinic at [phone] to schedule as the providers are booking out.              Sincerely,      Karyna Quiñones/

## 2024-11-15 NOTE — TELEPHONE ENCOUNTER
I wonder if Straterra would be a reasonable alternative to ritalin if the ritalin is makin you too jittjuan carlos    Letter mailed    Karyna Quiñones/

## 2025-01-04 ENCOUNTER — HEALTH MAINTENANCE LETTER (OUTPATIENT)
Age: 40
End: 2025-01-04

## 2025-01-07 ENCOUNTER — APPOINTMENT (OUTPATIENT)
Age: 40
Setting detail: DERMATOLOGY
End: 2025-01-07

## 2025-01-07 DIAGNOSIS — L72.0 EPIDERMAL CYST: ICD-10-CM

## 2025-01-07 DIAGNOSIS — D22 MELANOCYTIC NEVI: ICD-10-CM

## 2025-01-07 PROBLEM — D23.39 OTHER BENIGN NEOPLASM OF SKIN OF OTHER PARTS OF FACE: Status: ACTIVE | Noted: 2025-01-07

## 2025-01-07 PROBLEM — D22.5 MELANOCYTIC NEVI OF TRUNK: Status: ACTIVE | Noted: 2025-01-07

## 2025-01-07 PROCEDURE — ? ACNE SURGERY COSMETIC

## 2025-01-07 PROCEDURE — ? COUNSELING

## 2025-01-07 PROCEDURE — 99213 OFFICE O/P EST LOW 20 MIN: CPT

## 2025-01-07 PROCEDURE — ? ADDITIONAL NOTES

## 2025-01-07 ASSESSMENT — LOCATION SIMPLE DESCRIPTION DERM
LOCATION SIMPLE: RIGHT EYEBROW
LOCATION SIMPLE: LEFT EYEBROW
LOCATION SIMPLE: ABDOMEN
LOCATION SIMPLE: LEFT SUPERIOR EYELID

## 2025-01-07 ASSESSMENT — LOCATION DETAILED DESCRIPTION DERM
LOCATION DETAILED: RIGHT RIB CAGE
LOCATION DETAILED: LEFT MEDIAL SUPERIOR EYELID
LOCATION DETAILED: LEFT CENTRAL EYEBROW
LOCATION DETAILED: RIGHT CENTRAL EYEBROW

## 2025-01-07 ASSESSMENT — LOCATION ZONE DERM
LOCATION ZONE: FACE
LOCATION ZONE: TRUNK
LOCATION ZONE: EYELID

## 2025-01-07 NOTE — HPI: SKIN LESIONS
How Severe Is Your Skin Lesion?: mild
Have Your Skin Lesions Been Treated?: not been treated
Is This A New Presentation, Or A Follow-Up?: Skin Lesions
Which Family Member (Optional)?: Mother
Additional History: She would like the lesions treated today

## 2025-01-07 NOTE — PROCEDURE: ACNE SURGERY COSMETIC
Detail Level: Detailed
Acne Type: Comedonal Lesions
Extraction Method: 11 blade and comedo extractor
Render Number Of Lesions Treated: yes
Price (Use Numbers Only, No Special Characters Or $): 125
Prep Text (Optional): Prior to removal the treatment areas were prepped in the usual fashion.
Consent was obtained and risks were reviewed including but not limited to scarring, infection, bleeding, scabbing, incomplete removal, and allergy to anesthesia.
Render Post-Care Instructions In Note?: no
Post-Care Instructions: I reviewed with the patient in detail post-care instructions. Patient is to keep the treatment areas dry overnight, and then apply bacitracin twice daily until healed. Patient may apply hydrogen peroxide soaks to remove any crusting.

## 2025-01-07 NOTE — PROCEDURE: ADDITIONAL NOTES
Additional Notes: Discussed cosmetic laser treatment at an outside clinic if she would like to have telangiectasia treated cosmetically.
Detail Level: Simple
Render Risk Assessment In Note?: no
Additional Notes: Inventory plan missed initially. Manual charge entered by  and payment applied. In[employer] plan deleted to avoid duplicate charge

## 2025-05-07 ENCOUNTER — HOSPITAL ENCOUNTER (EMERGENCY)
Facility: CLINIC | Age: 40
Discharge: HOME | End: 2025-05-08
Attending: EMERGENCY MEDICINE
Payer: COMMERCIAL

## 2025-05-07 DIAGNOSIS — T50.902A MEDICATION OVERDOSE, INTENTIONAL SELF-HARM, INITIAL ENCOUNTER (H): ICD-10-CM

## 2025-05-07 DIAGNOSIS — F10.920 ALCOHOLIC INTOXICATION WITHOUT COMPLICATION: ICD-10-CM

## 2025-05-07 DIAGNOSIS — R45.851 SUICIDAL IDEATION: ICD-10-CM

## 2025-05-07 PROBLEM — F10.10 ALCOHOL ABUSE: Status: ACTIVE | Noted: 2025-05-07

## 2025-05-07 PROBLEM — F41.0 PANIC ATTACK: Status: ACTIVE | Noted: 2025-05-07

## 2025-05-07 PROBLEM — F32.9 MDD (MAJOR DEPRESSIVE DISORDER): Status: ACTIVE | Noted: 2025-05-07

## 2025-05-07 LAB
ALBUMIN SERPL BCG-MCNC: 4.7 G/DL (ref 3.5–5.2)
ALP SERPL-CCNC: 53 U/L (ref 40–150)
ALT SERPL W P-5'-P-CCNC: 27 U/L (ref 0–50)
AMPHETAMINES UR QL SCN: NORMAL
ANION GAP SERPL CALCULATED.3IONS-SCNC: 15 MMOL/L (ref 7–15)
AST SERPL W P-5'-P-CCNC: 31 U/L (ref 0–45)
BARBITURATES UR QL SCN: NORMAL
BASOPHILS # BLD AUTO: 0 10E3/UL (ref 0–0.2)
BASOPHILS NFR BLD AUTO: 0 %
BENZODIAZ UR QL SCN: NORMAL
BILIRUB SERPL-MCNC: 0.2 MG/DL
BUN SERPL-MCNC: 8.6 MG/DL (ref 6–20)
BZE UR QL SCN: NORMAL
CALCIUM SERPL-MCNC: 9.4 MG/DL (ref 8.8–10.4)
CANNABINOIDS UR QL SCN: NORMAL
CHLORIDE SERPL-SCNC: 97 MMOL/L (ref 98–107)
CREAT SERPL-MCNC: 0.7 MG/DL (ref 0.51–0.95)
EGFRCR SERPLBLD CKD-EPI 2021: >90 ML/MIN/1.73M2
EOSINOPHIL # BLD AUTO: 0.1 10E3/UL (ref 0–0.7)
EOSINOPHIL NFR BLD AUTO: 2 %
ERYTHROCYTE [DISTWIDTH] IN BLOOD BY AUTOMATED COUNT: 15.7 % (ref 10–15)
ETHANOL SERPL-MCNC: 0.24 G/DL
FENTANYL UR QL: NORMAL
GLUCOSE SERPL-MCNC: 124 MG/DL (ref 70–99)
HCO3 SERPL-SCNC: 25 MMOL/L (ref 22–29)
HCT VFR BLD AUTO: 30.6 % (ref 35–47)
HGB BLD-MCNC: 9.4 G/DL (ref 11.7–15.7)
IMM GRANULOCYTES # BLD: 0 10E3/UL
IMM GRANULOCYTES NFR BLD: 0 %
LYMPHOCYTES # BLD AUTO: 2 10E3/UL (ref 0.8–5.3)
LYMPHOCYTES NFR BLD AUTO: 23 %
MCH RBC QN AUTO: 22.4 PG (ref 26.5–33)
MCHC RBC AUTO-ENTMCNC: 30.7 G/DL (ref 31.5–36.5)
MCV RBC AUTO: 73 FL (ref 78–100)
MONOCYTES # BLD AUTO: 0.5 10E3/UL (ref 0–1.3)
MONOCYTES NFR BLD AUTO: 5 %
NEUTROPHILS # BLD AUTO: 6 10E3/UL (ref 1.6–8.3)
NEUTROPHILS NFR BLD AUTO: 70 %
NRBC # BLD AUTO: 0 10E3/UL
NRBC BLD AUTO-RTO: 0 /100
OPIATES UR QL SCN: NORMAL
PCP QUAL URINE (ROCHE): NORMAL
PLATELET # BLD AUTO: 352 10E3/UL (ref 150–450)
POTASSIUM SERPL-SCNC: 3.5 MMOL/L (ref 3.4–5.3)
PROT SERPL-MCNC: 7.4 G/DL (ref 6.4–8.3)
RBC # BLD AUTO: 4.2 10E6/UL (ref 3.8–5.2)
SODIUM SERPL-SCNC: 137 MMOL/L (ref 135–145)
WBC # BLD AUTO: 8.7 10E3/UL (ref 4–11)

## 2025-05-07 PROCEDURE — 99285 EMERGENCY DEPT VISIT HI MDM: CPT | Performed by: EMERGENCY MEDICINE

## 2025-05-07 PROCEDURE — 80307 DRUG TEST PRSMV CHEM ANLYZR: CPT | Performed by: EMERGENCY MEDICINE

## 2025-05-07 PROCEDURE — 82310 ASSAY OF CALCIUM: CPT | Performed by: EMERGENCY MEDICINE

## 2025-05-07 PROCEDURE — 85025 COMPLETE CBC W/AUTO DIFF WBC: CPT | Performed by: EMERGENCY MEDICINE

## 2025-05-07 PROCEDURE — 36415 COLL VENOUS BLD VENIPUNCTURE: CPT | Performed by: EMERGENCY MEDICINE

## 2025-05-07 PROCEDURE — 80143 DRUG ASSAY ACETAMINOPHEN: CPT | Performed by: EMERGENCY MEDICINE

## 2025-05-07 PROCEDURE — 93005 ELECTROCARDIOGRAM TRACING: CPT | Performed by: EMERGENCY MEDICINE

## 2025-05-07 PROCEDURE — 80179 DRUG ASSAY SALICYLATE: CPT | Performed by: EMERGENCY MEDICINE

## 2025-05-07 PROCEDURE — 82077 ASSAY SPEC XCP UR&BREATH IA: CPT | Performed by: EMERGENCY MEDICINE

## 2025-05-07 ASSESSMENT — COLUMBIA-SUICIDE SEVERITY RATING SCALE - C-SSRS
3. HAVE YOU BEEN THINKING ABOUT HOW YOU MIGHT KILL YOURSELF?: YES
4. HAVE YOU HAD THESE THOUGHTS AND HAD SOME INTENTION OF ACTING ON THEM?: NO
2. HAVE YOU ACTUALLY HAD ANY THOUGHTS OF KILLING YOURSELF IN THE PAST MONTH?: YES
6. HAVE YOU EVER DONE ANYTHING, STARTED TO DO ANYTHING, OR PREPARED TO DO ANYTHING TO END YOUR LIFE?: NO
5. HAVE YOU STARTED TO WORK OUT OR WORKED OUT THE DETAILS OF HOW TO KILL YOURSELF? DO YOU INTEND TO CARRY OUT THIS PLAN?: YES
1. IN THE PAST MONTH, HAVE YOU WISHED YOU WERE DEAD OR WISHED YOU COULD GO TO SLEEP AND NOT WAKE UP?: YES

## 2025-05-07 ASSESSMENT — ACTIVITIES OF DAILY LIVING (ADL): ADLS_ACUITY_SCORE: 41

## 2025-05-08 VITALS
BODY MASS INDEX: 27.66 KG/M2 | TEMPERATURE: 97.7 F | WEIGHT: 162 LBS | SYSTOLIC BLOOD PRESSURE: 124 MMHG | HEART RATE: 84 BPM | OXYGEN SATURATION: 92 % | DIASTOLIC BLOOD PRESSURE: 69 MMHG | HEIGHT: 64 IN | RESPIRATION RATE: 18 BRPM

## 2025-05-08 LAB
APAP SERPL-MCNC: <5 UG/ML (ref 10–30)
ATRIAL RATE - MUSE: 73 BPM
DIASTOLIC BLOOD PRESSURE - MUSE: NORMAL MMHG
INTERPRETATION ECG - MUSE: NORMAL
P AXIS - MUSE: 37 DEGREES
PR INTERVAL - MUSE: 150 MS
QRS DURATION - MUSE: 92 MS
QT - MUSE: 406 MS
QTC - MUSE: 447 MS
R AXIS - MUSE: 65 DEGREES
SALICYLATES SERPL-MCNC: <0.3 MG/DL (ref ?–30)
SYSTOLIC BLOOD PRESSURE - MUSE: NORMAL MMHG
T AXIS - MUSE: 46 DEGREES
VENTRICULAR RATE- MUSE: 73 BPM

## 2025-05-08 ASSESSMENT — ACTIVITIES OF DAILY LIVING (ADL)
ADLS_ACUITY_SCORE: 41

## 2025-05-08 NOTE — CONSULTS
"Diagnostic Evaluation Consultation  Crisis Assessment    Patient Name: Stephani Rider  Age:  39 year old  Legal Sex: female  Gender Identity: female  Pronouns:      Race: White  Ethnicity: Not  or   Language: English      Patient was assessed: Virtual: Best Five Reviewed   Crisis Assessment Start Date: 05/07/25  Crisis Assessment Start Time: 2326  Crisis Assessment Stop Time: 0006  Patient location: [facility] Emergency Dept                             ED20    Referral Data and Chief Complaint  Stephani Rider presents to the ED with family/friends. Patient is presenting to the ED for the following concerns: Suicidal ideation, marital discord, Depression, Substance use, Worsening psychosocial stress, Intoxication. Factors that make the mental health crisis life threatening or complex are: Patient is intoxicated. Dx MDD/panic at age 25, year after pt said began having symptoms she says at age 24.. No therapy, but past antianxiety medications \"with \" after she had an affair 11 years ago.       Informed Consent and Assessment Methods  Explained the crisis assessment process, including applicable information disclosures and limits to confidentiality, assessed understanding of the process, and obtained consent to proceed with the assessment.  Assessment methods included conducting a formal interview with patient, review of medical records, collaboration with medical staff, and obtaining relevant collateral information from family and community providers when available.  :       History of the Crisis   Patient Stephani Rider is a 39 year old female who presents intoxicated to the emergency room with Spouse Victor Manuel. Patient states that she has been drinking alcohol daily and that it is \"really bad.\"  Stephani reports she took 4 tablets of an unknown strength of diphenhydramine, in an attempt to sleep, went and told the  that she had done this and that it was a suicide attempt.  He looked it up " online and stated that he did not think that they needed to come into the hospital and he would watch her.  She states then took some more pills, and told  she subsequently had taken more pills.   then brought her to ED.     She states to both ED triage staff that she did not truly want to end her life but specifically states that she wanted to make it look like she was trying to end her life in the eyes of her .  She is slurring words some but appears to be sober enough to participate in valid DEC assessment at this time.  SCOTTY at time of ED intial encounter was .24.  She is not interested in CD treatment but says she wants therapy.  She states she had an affair years ago, then recently her  suspected another affair.  She says she was having an affair but with someone other than who her  suspected.  She has 3 children.  She denies wanting to end her life.  She says she is no longer working because the affair was with her former boss.      Patient has PMH dx to include MDD.  She says she and her  went on antianxiety medications together 11 years ago after first affair but that she has never been to therapy. We set up a therapy appointment for Friday, which is now tomorrow.  She will sober in ED and return home.   agrees he believes she is safe to return home after she mj.  She participated in safety planning.  She denies hx of suicide attempts and none were found in chart history.  She has had a few periods of sobriety she states when she has started to worry about weight gain.  She has no active eating disorders but does appear to have some disordered eating, worry about weight. No DUIs or other legal issues were found in [place] Court records.    Brief Psychosocial History  Family:  , Children yes  Support System:  Parent(s), Sibling(s)  Employment Status:  unemployed  Source of Income:  other (see comments)  Financial Environmental Concerns:  none  Current  Hobbies:  family functions  Barriers in Personal Life:  mental health concerns, behavioral concerns    Significant Clinical History  Current Anxiety Symptoms:     Current Depression/Trauma:  impaired decision making, avoidance  Current Somatic Symptoms:     Current Psychosis/Thought Disturbance:  displaces blame  Current Eating Symptoms:     Chemical Use History:  Alcohol: Binge, Daily, Blackouts  Last Use:: 05/07/25   Past diagnosis:  Depression (it is unclear if she has had PMH of BETI dx)  Family history:  Substance Use Disorder, Depression  Past treatment:  Psychiatric Medication Management  Details of most recent treatment:  Medications 11 years ago.  Says she doesn't like the 12 steps but not clear she has attended AA.  Writer gives her alternatives to AA.  Other relevant history:       Have there been any medication changes in the past two weeks:  patient is not on psychiatric meds       Is the patient compliant with medications:           Collateral Information  Is there collateral information: Yes     Collateral information name, relationship, phone number:  Victor Manuel Rider  Spouse  [phone]    What happened today: Patient told him she had ingested Bendryl, he did not think it was enough to take her to ED.  She then reported she had taken more in a suicide attempt.  she had been drinking alcohol.  He brought her to ED.     What is different about patient's functioning: Been drinking frequently recently     What do you think the patient needs:  to sober then return home    Has patient made comments about wanting to kill themselves/others: yes    If d/c is recommended, can they take part in safety/aftercare planning:  yes    Additional collateral information:  He appears a bit fatigued with patient's behavior but states she can come home and he believes she is safe to do so and that this is what she needs.     Risk Assessment  [place] Suicide Severity Rating Scale Full Clinical Version:  Suicidal  Ideation  Q1 Wish to be Dead (Lifetime): Yes  Q2 Non-Specific Active Suicidal Thoughts (Lifetime): Yes  3. Active Suicidal Ideation with any Methods (Not Plan) Without Intent to Act (Lifetime): Yes  4. Active Suicidal Ideation with Some Intent to Act, Without Specific Plan (Lifetime): Yes  5. Active Suicidal Ideation with Specific Plan and Intent (Lifetime): No  Q6 Suicide Behavior (Lifetime): yes     Suicidal Behavior (Lifetime)  Actual Attempt (Lifetime): No  Has subject engaged in non-suicidal self-injurious behavior? (Lifetime): No    [place]wen Suicide Severity Rating Scale Recent:   Suicidal Ideation (Recent)  Q1 Wished to be Dead (Past Month): yes  Q2 Suicidal Thoughts (Past Month): yes  Q3 Suicidal Thought Method: yes  Q4 Suicidal Intent without Specific Plan: no  Q5 Suicide Intent with Specific Plan: no  Level of Risk per Screen: moderate risk     Suicidal Behavior (Recent)  Actual Attempt (Past 3 Months): No  Has subject engaged in non-suicidal self-injurious behavior? (Past 3 Months): Yes    Environmental or Psychosocial Events: challenging interpersonal relationships, impulsivity/recklessness, unemployment/underemployment, threats to a prized relationship, ongoing abuse of substances  Protective Factors: Protective Factors: help seeking    Does the patient have thoughts of harming others? Feels Like Hurting Others: no  Previous Attempt to Hurt Others: no  Is the patient engaging in sexually inappropriate behavior?:  (reports a recent affair with 72 year old former boss)  Does Patient have a known history of aggressive behavior: No    Is the patient engaging in sexually inappropriate behavior?   (reports a recent affair with 72 year old former boss)        Mental Status Exam   Affect: Labile  Appearance: Appropriate  Attention Span/Concentration: Attentive  Eye Contact: Engaged    Fund of Knowledge: Appropriate   Language /Speech Content: Other (please comment)  Language /Speech Volume: Normal  Language  /Speech Rate/Productions: Hyperverbal (slurred at times)  Recent Memory: Variable  Remote Memory: Variable  Mood: Apathetic, Irritable  Orientation to Person: Yes   Orientation to Place: Yes  Orientation to Time of Day: Yes  Orientation to Date: Yes     Situation (Do they understand why they are here?): Yes  Psychomotor Behavior: Normal  Thought Content: Clear  Thought Form: Other (please comment) (variable, patient intoxicated)     Mini-Cog Assessment  Number of Words Recalled:    Clock-Drawing Test:     Three Item Recall:    Mini-Cog Total Score:       Medication  Psychotropic medications:   Medication Orders - Psychiatric (From admission, onward)      None             Current Care Team  Patient Care Team:  Tana Soto MD as PCP - General (Family Medicine)  Tana Soto MD as Assigned PCP    Diagnosis  Patient Active Problem List   Diagnosis Code    CARDIOVASCULAR SCREENING; LDL GOAL LESS THAN 160 Z13.6    Post term pregnancy O48.0    Active labor [id number]    Delivery normal O80    Alcohol abuse F10.10    MDD (major depressive disorder) F32.9    Panic attack F41.0       Primary Problem This Admission  Active Hospital Problems    Alcohol abuse      *MDD (major depressive disorder)        Clinical Summary and Substantiation of Recommendations   Clinical Substantiation:  Patient Stephani states she knew what she ingested wouldn't end her life but that she wanted her spouse Victor Manuel to believe she had made an active suicide attempt.  She arrives to ED intoxicated but states she has no interest in CD assessment or treatment at this time. She does set up therapy appointment at time of assessment.      While patient's behavior is concerning, she denies that she is actually suicidal.  Her  states he does not believe she needs to be admitted, that she is not at risk and that she can return home once sober.  She does set up therapy appointment for Friday, which is now tomorrow.  Since she is intoxicated, she will remain  in ED.  Patient will discharge when more sober unless ED believes a reassessment is warrented.    Goals for crisis stabilization:  sober, monitor for possible ingestion effects/need for medical care    Next steps for Care Team:  monitor for sobriety, possible overdose/needed medical intervention    Treatment Objectives Addressed:  rapport building, safety planning, identifying an appropriate aftercare plan, building distress tolerance, assessing safety, exploring obstacles to safety in the community    Therapeutic Interventions:  Engaged in safety planning, Engaged in cognitive restructuring/ reframing, looked at common cognitive distortions and challenged negative thoughts., Discussed and practiced mindfulness.    Has a specific means been identified for suicidal/homicide actions: Yes    If yes, describe:  ingestion    Explain action steps toward mitigation:  Told  after taking 4 Benadryl, then reportedly took more of a medication when he did not react    Document completion of mitigation actions:  See chart    The follow up action still needed prior to discharge:  Patient will sober in ED awhile.  ED may discharge of request DEC reassessment if the latter appears warrented.    Patient coping skills attempted to reduce the crisis:  uses alcohol. Told spouse she had ingested pills to commit suicide then denies she made an actual suicide attempt.     Disposition  Recommended referrals: Individual Therapy, BETI Comprehensive Assessment, Medication Management        Reviewed case and recommendations with attending provider. Attending Name: Tom Adams       Attending concurs with disposition: yes       Patient and/or validated legal guardian concurs with disposition:   yes       Final disposition:  discharge when more sober; (reassessment at ED discretion if triage staff deems warranted at that time).                             Legal status: Voluntary/Patient has signed consent for treatment                                                                                                                                  Reviewed court records: yes       Assessment Details   Total duration spent with the patient: 40 min     CPT code(s) utilized: 07473 - Psychotherapy for Crisis - 60 (30-74*) min    RASHEL River, Psychotherapist  DEC - Triage & Transition Services  Callback: [phone]

## 2025-05-08 NOTE — DISCHARGE INSTRUCTIONS
Scheduled Appointment:      Date: Friday, 5/9/2025  Time: 2:00 pm - 3:00 pm  Provider: Hilary Payne MA  [facility]  Location: [facility], [address] [place] [address] [place] [address]  Phone: [phone]  Type: Teletherapy    It is your responsibility to contact your insurance company directly to verify coverage, eligibility, payment, and benefit information for any appointments or referrals listed above.    [facility] maintains an extensive network of licensed behavioral health providers to connect patients with the services they need. We do not charge providers a fee to participate in our referral network. We match patients with providers based on a patient's specific treatment needs, insurance coverage, and location. Our first effort will be to refer you to a provider within your care system and will utilize providers outside your care system as needed.     __________________________________________________________________________________________________________________________________________________________________________________

## 2025-05-08 NOTE — ED PROVIDER NOTES
"  Emergency Department Note      History of Present Illness     Chief Complaint  Drug Overdose and Suicide Attempt    HPI  Stephani Rider is a 39 year old female who presents to the emergency room after a possible suicide attempt.  She states that she has been drinking alcohol, and also took 4 tablets of an unknown strength of diphenhydramine, in an attempt to sleep, went and told the  that she had done this and that it was a suicide attempt.  He looked it up online and stated that he did not think that they needed to come into the hospital and he would watch her.  She then went down and took 6 more pills, a different antihistamine cetirizine, and then came and told her  who then brought her into the hospital.  She states that she did not truly want to end her life but specifically states that she wanted to make it look like she was trying to end her life in the eyes of her .  When asked if this was a cry for help, per say she states \"it was more of just a regular cry\"      Independent Historian  Yes patient's  is at the bedside and confirms the above history, notes that he does not necessarily trust the details that she provides but did bring the cetirizine bottle here.    Review of External Notes  No recent visits noted      Past Medical History   Medical History and Problem List  Past Medical History:   Diagnosis Date    Breast disorder     NO ACTIVE PROBLEMS        Medications  albuterol (PROAIR HFA/PROVENTIL HFA/VENTOLIN HFA) 108 (90 Base) MCG/ACT inhaler  azithromycin (ZITHROMAX) 250 MG tablet  benzonatate (TESSALON) 200 MG capsule  escitalopram (LEXAPRO) 10 MG tablet  predniSONE (DELTASONE) 20 MG tablet        Surgical History   Past Surgical History:   Procedure Laterality Date    [facility] NONSPECIFIC PROCEDURE      cyst removed from neck at 6 months of age         Physical Exam     Patient Vitals for the past 24 hrs:   BP Temp Temp src Pulse Resp SpO2 Weight   05/07/25 2256 (!) " 140/99 -- -- -- -- -- --   05/07/25 2254 -- 97.5  F (36.4  C) Temporal 109 16 97 % 73.8 kg (162 lb 11.2 oz)       Physical Exam  Vitals: reviewed by me  General: [facility], cooperative, and alert to conversation  Eyes: Tracking well, clear conjunctiva BL  ENT: MMM, midline trachea.   Lungs: No tachypnea, no accessory muscle use. No respiratory distress.   CV: Rate as above  MSK: no joint effusion.  No evidence of trauma  Skin: No rash  Neuro: Slurred speech and no facial droop.  Moving all extremities and following all commands  Psych: Not RIS, no e/o AH/VH      Diagnostics   Lab Results   Labs Ordered and Resulted from Time of ED Arrival to Time of ED Departure   COMPREHENSIVE METABOLIC PANEL - Abnormal       Result Value    Sodium 137      Potassium 3.5      Carbon Dioxide (CO2) 25      Anion Gap 15      Urea Nitrogen 8.6      Creatinine 0.70      GFR Estimate >90      Calcium 9.4      Chloride 97 (*)     Glucose 124 (*)     Alkaline Phosphatase 53      AST 31      ALT 27      Protein Total 7.4      Albumin 4.7      Bilirubin Total 0.2     ACETAMINOPHEN LEVEL - Abnormal    Acetaminophen <5.0 (*)    CBC WITH PLATELETS AND DIFFERENTIAL - Abnormal    WBC Count 8.7      RBC Count 4.20      Hemoglobin 9.4 (*)     Hematocrit 30.6 (*)     MCV 73 (*)     MCH 22.4 (*)     MCHC 30.7 (*)     RDW 15.7 (*)     Platelet Count 352      % Neutrophils 70      % Lymphocytes 23      % Monocytes 5      % Eosinophils 2      % Basophils 0      % Immature Granulocytes 0      NRBCs per 100 WBC 0      Absolute Neutrophils 6.0      Absolute Lymphocytes 2.0      Absolute Monocytes 0.5      Absolute Eosinophils 0.1      Absolute Basophils 0.0      Absolute Immature Granulocytes 0.0      Absolute NRBCs 0.0     ETHYL ALCOHOL LEVEL - Abnormal    Alcohol ethyl 0.24 (*)    SALICYLATE LEVEL - Normal    Salicylate <0.3     URINE DRUG SCREEN PANEL - Normal    Amphetamines Urine Screen Negative      Barbituates Urine Screen  Negative      Benzodiazepine Urine Screen Negative      Cannabinoids Urine Screen Negative      Cocaine Urine Screen Negative      Fentanyl Qual Urine Screen Negative      Opiates Urine Screen Negative      PCP Urine Screen Negative         EKG   ECG results from 05/07/25   EKG 12-lead, tracing only     Value    Systolic Blood Pressure     Diastolic Blood Pressure     Ventricular Rate 73    Atrial Rate 73    MO Interval 150    QRS Duration 92        QTc 447    P Axis 37    R AXIS 65    T Axis 46    Interpretation ECG      Sinus rhythm  Normal ECG  No previous ECGs available  Reviewed by Bryan PONCE           ED Course      Medications Administered   Medications - No data to display       Procedures      Discussion of Management   Yes I have requested a formal mental health evaluation with a DEC assessment team.        Optional/Additional Documentation  Stress/Adjustment Disorders       Medical Decision Making / Diagnosis         MDM  This is a 39-year-old female who presents to the emergency room with what she states was essentially a suicidal gesture.  She states that she did not want to actually kill herself, but wanted to make her  think that this was happening and it sounds like she took 4 tablets of Benadryl and also another 6 tablets of Zyrtec.  She was watched for over 6 hours and thankfully her EKG is unremarkable and her coingestion labs are reassuring.  She was seen by her mental health provider as well who recommended discharge which I do think is reasonable.  She was intoxicated with alcohol but has metabolized and at this point she is stable for discharge home.  I have asked her to follow-up with her regular doctor in the week ahead, and I do not see any additional benefit to having her stay here in the hospital as she is already been observed for 6 hours.   at bedside feels comfortable with this plan as well, will plan for discharge as above.    ICD-10 Codes:    ICD-10-CM    1.  Alcoholic intoxication without complication  F10.920       2. Medication overdose, intentional self-harm, initial encounter (H)  T50.902A       3. Suicidal ideation  R45.851              Discharge Medications  New Prescriptions    No medications on file                  Elmer Adams MD  05/08/25 1350

## 2025-05-08 NOTE — ED NOTES
Date:November 20, 2020      Patient was self referred, no letter generated. Do not send.        [facility] Information       Bed: ED20  Expected date:   Expected time:   Means of arrival:   Comments:   only

## 2025-05-08 NOTE — ED NOTES
"Pt stated,\" this episode was a spare of the moment sort of thing\" Caio history of SI/HI or previous suicide attempts.   Pt states she's been drinking daily for the past 3 years. Pt reports starting around 5 pm, she's had 8 beers and 2 shots of vodka.  at bedside  "

## 2025-05-08 NOTE — PLAN OF CARE
Stephani STOKES Hastert  May 8, 2025  Plan of Care Hand-off Note     Patient Recommended Care Path: discharge (after time to sober with reassessment by DEC at ED discretion)     Clinical Substantiation:  Patient Stephani states she knew what she ingested wouldn't end her life but that she wanted her spouse Victor Manuel to believe she had made an active suicide attempt.  She arrives to ED intoxicated but states she has no interest in CD assessment or BETI treatment at this time. She does set up therapy appointment at time of assessment.      While patient's behavior is concerning, she denies that she is actually suicidal. She does not appear holdable. Her  states he does not believe she needs to be admitted, that she is not at risk for suicide and that she can return home once sober.  She does set up therapy appointment for Friday, which is now tomorrow as noted.  Since she is intoxicated, she will remain in ED until more sobered.  Patient will discharge when more sober unless ED believes a reassessment is warrented.    Goals for crisis stabilization:  sober, monitor for possible ingestion effects/need for medical care    Next steps for Care Team:  monitor for sobriety, possible needed medical intervention for potential overdose-- writer did order extended care, which drops off at ED discharge--   patient can discharge at ED discretion unless ED staff believes a DEC reassessment is warranted     Treatment Objectives Addressed:  rapport building, safety planning, identifying an appropriate aftercare plan, building distress tolerance, assessing safety, exploring obstacles to safety in the community    Therapeutic Interventions:  Engaged in safety planning, Engaged in cognitive restructuring/ reframing, looked at common cognitive distortions and challenged negative thoughts., Discussed and practiced mindfulness.    Has a specific means been identified for suicidal.homicide actions: Yes  If yes, describe: ingestion  Explain action  steps toward mitigation: Told  after taking 4 Benadryl, then reportedly took more of a medication when he did not react strongly enough per patient   Document completion of mitigation action: See chart  The follow up action still needed prior to discharge: Patient will sober in ED awhile.  ED may discharge of request DEC reassessment if the latter appears warrented.    Patient coping skills attempted to reduce the crisis:  has been using alcohol daily.  Patient did set up a therapy appointment for Friday, now tomorrow, at time of assessment.  See AVS and aftercare safety plan.                           Collateral contact information:  Victor Manuel Rider  Spouse  [phone]    Legal Status: Voluntary/Patient has signed consent for treatment                                                                                                                                 Reviewed court records: yes     Psychiatry Consult: No -- patient wants therapy only per patient at time of assessment     RASHEL River

## 2025-05-08 NOTE — ED NOTES
Patient appears intoxicated in DEC assessment but is able to participate in DEC.  SCOTTY unclear    UPDATE SCOTTY received at .24 at time of draw.

## 2025-05-08 NOTE — ED TRIAGE NOTES
Patient presents with . He reports that she took 4 benadryl and 6 sleeping pills. She states she did it in an attempt to make herself fall asleep so she doesn't have to wake up again. Patient is tearful in triage.